# Patient Record
Sex: MALE | Race: WHITE | NOT HISPANIC OR LATINO | Employment: FULL TIME | URBAN - METROPOLITAN AREA
[De-identification: names, ages, dates, MRNs, and addresses within clinical notes are randomized per-mention and may not be internally consistent; named-entity substitution may affect disease eponyms.]

---

## 2023-03-02 PROBLEM — M21.40 PES PLANUS: Status: ACTIVE | Noted: 2023-03-02

## 2023-03-02 PROBLEM — G44.309 POST-CONCUSSION HEADACHE: Status: ACTIVE | Noted: 2023-03-02

## 2023-03-02 PROBLEM — M54.2 NECK PAIN ON LEFT SIDE: Status: ACTIVE | Noted: 2023-03-02

## 2023-03-02 PROBLEM — S63.502A LEFT WRIST SPRAIN, INITIAL ENCOUNTER: Status: ACTIVE | Noted: 2023-03-02
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
## 2023-03-02 PROBLEM — K52.9 CHRONIC DIARRHEA: Status: ACTIVE | Noted: 2023-03-02

## 2023-03-02 PROBLEM — R11.0 NAUSEA IN ADULT: Status: ACTIVE | Noted: 2023-03-02

## 2023-03-02 PROBLEM — F41.9 ANXIETY DISORDER: Status: ACTIVE | Noted: 2023-03-02

## 2023-03-02 PROBLEM — R11.2 CANNABINOID HYPEREMESIS SYNDROME: Status: ACTIVE | Noted: 2023-03-02

## 2023-03-02 PROBLEM — R73.9 HYPERGLYCEMIA: Status: ACTIVE | Noted: 2023-03-02

## 2023-03-02 PROBLEM — S06.0XAA CONCUSSION: Status: ACTIVE | Noted: 2023-03-02

## 2023-03-02 PROBLEM — M21.40 FLAT FOOT: Status: ACTIVE | Noted: 2023-03-02

## 2023-03-02 PROBLEM — M79.642 PAIN OF LEFT HAND: Status: ACTIVE | Noted: 2023-03-02

## 2023-03-02 PROBLEM — R42 DIZZINESS: Status: ACTIVE | Noted: 2023-03-02

## 2023-03-02 PROBLEM — G56.02 CARPAL TUNNEL SYNDROME OF LEFT WRIST: Status: ACTIVE | Noted: 2023-03-02

## 2023-03-02 PROBLEM — F12.90 CANNABINOID HYPEREMESIS SYNDROME: Status: ACTIVE | Noted: 2023-03-02

## 2023-03-02 PROBLEM — F17.210 SMOKES CIGARETTES: Status: ACTIVE | Noted: 2023-03-02

## 2023-03-02 PROBLEM — E87.6 HYPOKALEMIA: Status: ACTIVE | Noted: 2023-03-02

## 2023-03-02 PROBLEM — S06.0XAD: Status: ACTIVE | Noted: 2023-03-02

## 2023-03-02 RX ORDER — TIZANIDINE HYDROCHLORIDE 4 MG/1
1-2 CAPSULE, GELATIN COATED ORAL EVERY 8 HOURS PRN
COMMUNITY
End: 2024-05-20 | Stop reason: ALTCHOICE

## 2023-03-02 RX ORDER — RIBOFLAVIN (VITAMIN B2) 100 MG
100 TABLET ORAL DAILY
COMMUNITY
End: 2023-12-26 | Stop reason: ALTCHOICE

## 2023-03-02 RX ORDER — LORAZEPAM 0.5 MG/1
TABLET ORAL
COMMUNITY

## 2023-03-02 RX ORDER — PROPRANOLOL HYDROCHLORIDE 20 MG/1
20 TABLET ORAL 2 TIMES DAILY
COMMUNITY

## 2023-03-02 RX ORDER — QUETIAPINE FUMARATE 25 MG/1
300 TABLET, FILM COATED ORAL NIGHTLY
COMMUNITY

## 2023-03-02 RX ORDER — LIDOCAINE HYDROCHLORIDE 20 MG/ML
1 INJECTION, SOLUTION EPIDURAL; INFILTRATION; INTRACAUDAL; PERINEURAL
COMMUNITY
Start: 2022-01-27 | End: 2024-05-20 | Stop reason: ALTCHOICE

## 2023-03-15 ENCOUNTER — TELEMEDICINE (OUTPATIENT)
Dept: PRIMARY CARE | Facility: CLINIC | Age: 28
End: 2023-03-15
Payer: COMMERCIAL

## 2023-03-15 DIAGNOSIS — S06.0XAD CONCUSSION WITH LOSS OF CONSCIOUSNESS STATUS UNKNOWN, SUBSEQUENT ENCOUNTER: Primary | ICD-10-CM

## 2023-03-15 PROCEDURE — 99213 OFFICE O/P EST LOW 20 MIN: CPT | Performed by: NURSE PRACTITIONER
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
## 2023-04-17 ENCOUNTER — OFFICE VISIT (OUTPATIENT)
Dept: PRIMARY CARE | Facility: CLINIC | Age: 28
End: 2023-04-17
Payer: COMMERCIAL

## 2023-04-17 VITALS
WEIGHT: 145.4 LBS | HEART RATE: 74 BPM | HEIGHT: 68 IN | DIASTOLIC BLOOD PRESSURE: 83 MMHG | BODY MASS INDEX: 22.04 KG/M2 | TEMPERATURE: 98.6 F | SYSTOLIC BLOOD PRESSURE: 125 MMHG

## 2023-04-17 DIAGNOSIS — B02.9 HERPES ZOSTER WITHOUT COMPLICATION: Primary | ICD-10-CM

## 2023-04-17 PROBLEM — E87.6 HYPOKALEMIA: Status: RESOLVED | Noted: 2023-03-02 | Resolved: 2023-04-17

## 2023-04-17 PROBLEM — S06.0XAA CONCUSSION: Status: RESOLVED | Noted: 2023-03-02 | Resolved: 2023-04-17

## 2023-04-17 PROBLEM — J30.2 SEASONAL ALLERGIES: Status: ACTIVE | Noted: 2023-04-17

## 2023-04-17 PROCEDURE — 99213 OFFICE O/P EST LOW 20 MIN: CPT | Performed by: NURSE PRACTITIONER

## 2023-04-17 RX ORDER — ACYCLOVIR 800 MG/1
800 TABLET ORAL
COMMUNITY

## 2023-04-17 ASSESSMENT — PATIENT HEALTH QUESTIONNAIRE - PHQ9
1. LITTLE INTEREST OR PLEASURE IN DOING THINGS: NOT AT ALL
SUM OF ALL RESPONSES TO PHQ9 QUESTIONS 1 AND 2: 0
2. FEELING DOWN, DEPRESSED OR HOPELESS: NOT AT ALL

## 2023-04-17 ASSESSMENT — ENCOUNTER SYMPTOMS
CONSTITUTIONAL NEGATIVE: 1
SHORTNESS OF BREATH: 0
WHEEZING: 0

## 2023-04-17 NOTE — PATIENT INSTRUCTIONS
Get norco rx  Get acyclovir rx  Declines gabapentin but has some at home. If you want to start this, start 300mg 1 a day x 2-3d then 1 twice a day x 2-3d then 1 three times a day  Call if sx worsen or change especially fever, chills, redness increasing around the areas.    Off work note-off thru 4/30-rtn 5/1    Return for physical      I will communicate with you via [employer] regarding messages and results. If you need help with this, you can call the support line at [phone].    IT WAS A PLEASURE TO SEE YOU TODAY. THANK YOU FOR CHOOSING US FOR YOUR HEALTHCARE NEEDS.

## 2023-04-17 NOTE — LETTER
April 17, 2023     Patient: Wade Holden   YOB: 1995   Date of Visit: 4/17/2023       To Whom It May Concern:    Wade Holden was seen in my clinic on 4/17/2023 at 2:00 pm. He will need to be off work through April 30th due to his medical condition. He can return to work with no restrictions on May 1, 2023.    If you have any questions or concerns, please don't hesitate to call.         Sincerely,         Maty Flor, APRN-CNP        CC: No Recipients

## 2023-04-17 NOTE — PROGRESS NOTES
Subjective   Patient ID: Wade Holden is a 27 y.o. male who presents for rib (Right sided rib pain.  Also having rash, went to [facility] and diagnosed with shingles in the rib area.  Saw Therapist and Liver specialist, but unsure of who.  Brother was 18 when he had colon cancer.  Defers counseling on quitting smoking.  Defers Tdap today. ).  Last physical: due    What was his brother's age when he had colon ca? Age 18  Did pt see gi dr? no  Did pt see liver dr? yes  If yes name and hernando  Name of psychiatrchristina-miguelito quit her practice. Now seeing glenis romero  No therapist  Does pt want to discuss quitting smoking? no  Does he want a tdap? No  Just moved  Been weaning down the ativan      Last labs-9/2022 cmp, 12/2021 cbc, 2019 a 1c 5.1; 2015 tsh nl    HPI  Rt rib pain x4d and rash x2d. Seen at [facility] ER today and dx with shingles.  Given rx for  norco (1d only), acyclovir  Runs sandblasters for washing machines      No care team member to display     Review of Systems   Constitutional: Negative.    Respiratory:  Negative for shortness of breath and wheezing.    Cardiovascular:  Negative for chest pain.   Skin:  Positive for rash.       Objective   Visit Vitals  /83   Pulse 74   Temp 37 °C (98.6 °F)      BP Readings from Last 3 Encounters:   04/17/23 125/83   09/19/22 124/80   09/13/22 133/87     Wt Readings from Last 3 Encounters:   04/17/23 66 kg (145 lb 6.4 oz)   09/19/22 61.3 kg (135 lb 2 oz)   09/13/22 59 kg (130 lb)       Physical Exam  Constitutional:       General: He is not in acute distress.     Appearance: Normal appearance.   Skin:     Comments: Red raised rash on rt side around to the back. Tender to touch. No drainage. No redness increasing around the areas.   Neurological:      Mental Status: He is alert.       Assessment/Plan   Diagnoses and all orders for this visit:  Herpes zoster without complication  Other orders  -     Follow Up In Primary Care; Future

## 2023-07-16 ASSESSMENT — ENCOUNTER SYMPTOMS
WHEEZING: 0
SHORTNESS OF BREATH: 0
CONSTITUTIONAL NEGATIVE: 1
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
## 2023-07-17 ENCOUNTER — OFFICE VISIT (OUTPATIENT)
Dept: PRIMARY CARE | Facility: CLINIC | Age: 28
End: 2023-07-17
Payer: COMMERCIAL

## 2023-07-17 VITALS
SYSTOLIC BLOOD PRESSURE: 134 MMHG | TEMPERATURE: 99.3 F | OXYGEN SATURATION: 95 % | BODY MASS INDEX: 22.44 KG/M2 | HEART RATE: 82 BPM | DIASTOLIC BLOOD PRESSURE: 82 MMHG | WEIGHT: 147.6 LBS

## 2023-07-17 DIAGNOSIS — E83.42 HYPOMAGNESEMIA: Primary | ICD-10-CM

## 2023-07-17 DIAGNOSIS — K21.9 GASTROESOPHAGEAL REFLUX DISEASE, UNSPECIFIED WHETHER ESOPHAGITIS PRESENT: ICD-10-CM

## 2023-07-17 DIAGNOSIS — R11.0 CHRONIC NAUSEA: ICD-10-CM

## 2023-07-17 DIAGNOSIS — R11.10 CHRONIC VOMITING: ICD-10-CM

## 2023-07-17 DIAGNOSIS — E87.6 HYPOKALEMIA: ICD-10-CM

## 2023-07-17 LAB
NON-UH HIE BUN/CREAT RATIO: 15
NON-UH HIE BUN: 12 MG/DL (ref 9–23)
NON-UH HIE CALCIUM: 9.7 MG/DL (ref 8.7–10.4)
NON-UH HIE CALCULATED OSMOLALITY: 279 MOSM/KG (ref 275–295)
NON-UH HIE CHLORIDE: 107 MMOL/L (ref 98–107)
NON-UH HIE CO2, VENOUS: 28 MMOL/L (ref 20–31)
NON-UH HIE CREATININE: 0.8 MG/DL (ref 0.6–1.1)
NON-UH HIE GFR AA: >60
NON-UH HIE GLOMERULAR FILTRATION RATE: >60 ML/MIN/1.73M?
NON-UH HIE GLUCOSE: 96 MG/DL (ref 74–106)
NON-UH HIE K: 4.6 MMOL/L (ref 3.5–5.1)
NON-UH HIE MAGNESIUM: 2.3 MG/DL (ref 1.6–2.6)
NON-UH HIE NA: 140 MMOL/L (ref 135–145)

## 2023-07-17 PROCEDURE — 99214 OFFICE O/P EST MOD 30 MIN: CPT | Performed by: NURSE PRACTITIONER

## 2023-07-17 RX ORDER — FAMOTIDINE 20 MG/1
20 TABLET, FILM COATED ORAL 2 TIMES DAILY
Qty: 60 TABLET | Refills: 5 | Status: SHIPPED | OUTPATIENT
Start: 2023-07-17 | End: 2024-05-20 | Stop reason: ALTCHOICE

## 2023-07-17 RX ORDER — METOCLOPRAMIDE 10 MG/1
10 TABLET ORAL 4 TIMES DAILY
Qty: 120 TABLET | Refills: 5 | Status: SHIPPED | OUTPATIENT
Start: 2023-07-17 | End: 2024-05-20 | Stop reason: ALTCHOICE

## 2023-07-17 ASSESSMENT — PATIENT HEALTH QUESTIONNAIRE - PHQ9
3. TROUBLE FALLING OR STAYING ASLEEP OR SLEEPING TOO MUCH: MORE THAN HALF THE DAYS
7. TROUBLE CONCENTRATING ON THINGS, SUCH AS READING THE NEWSPAPER OR WATCHING TELEVISION: MORE THAN HALF THE DAYS
9. THOUGHTS THAT YOU WOULD BE BETTER OFF DEAD, OR OF HURTING YOURSELF: SEVERAL DAYS
2. FEELING DOWN, DEPRESSED OR HOPELESS: MORE THAN HALF THE DAYS
SUM OF ALL RESPONSES TO PHQ9 QUESTIONS 1 AND 2: 4
4. FEELING TIRED OR HAVING LITTLE ENERGY: MORE THAN HALF THE DAYS
6. FEELING BAD ABOUT YOURSELF - OR THAT YOU ARE A FAILURE OR HAVE LET YOURSELF OR YOUR FAMILY DOWN: SEVERAL DAYS
10. IF YOU CHECKED OFF ANY PROBLEMS, HOW DIFFICULT HAVE THESE PROBLEMS MADE IT FOR YOU TO DO YOUR WORK, TAKE CARE OF THINGS AT HOME, OR GET ALONG WITH OTHER PEOPLE: SOMEWHAT DIFFICULT
1. LITTLE INTEREST OR PLEASURE IN DOING THINGS: MORE THAN HALF THE DAYS
1. LITTLE INTEREST OR PLEASURE IN DOING THINGS: MORE THAN HALF THE DAYS
5. POOR APPETITE OR OVEREATING: NEARLY EVERY DAY
8. MOVING OR SPEAKING SO SLOWLY THAT OTHER PEOPLE COULD HAVE NOTICED. OR THE OPPOSITE, BEING SO FIGETY OR RESTLESS THAT YOU HAVE BEEN MOVING AROUND A LOT MORE THAN USUAL: SEVERAL DAYS
SUM OF ALL RESPONSES TO PHQ9 QUESTIONS 1 AND 2: 4
SUM OF ALL RESPONSES TO PHQ QUESTIONS 1-9: 16
2. FEELING DOWN, DEPRESSED OR HOPELESS: MORE THAN HALF THE DAYS

## 2023-07-17 ASSESSMENT — ANXIETY QUESTIONNAIRES
5. BEING SO RESTLESS THAT IT IS HARD TO SIT STILL: MORE THAN HALF THE DAYS
2. NOT BEING ABLE TO STOP OR CONTROL WORRYING: NEARLY EVERY DAY
GAD7 TOTAL SCORE: 18
IF YOU CHECKED OFF ANY PROBLEMS ON THIS QUESTIONNAIRE, HOW DIFFICULT HAVE THESE PROBLEMS MADE IT FOR YOU TO DO YOUR WORK, TAKE CARE OF THINGS AT HOME, OR GET ALONG WITH OTHER PEOPLE: SOMEWHAT DIFFICULT
3. WORRYING TOO MUCH ABOUT DIFFERENT THINGS: NEARLY EVERY DAY
4. TROUBLE RELAXING: MORE THAN HALF THE DAYS
7. FEELING AFRAID AS IF SOMETHING AWFUL MIGHT HAPPEN: NEARLY EVERY DAY
6. BECOMING EASILY ANNOYED OR IRRITABLE: MORE THAN HALF THE DAYS
1. FEELING NERVOUS, ANXIOUS, OR ON EDGE: NEARLY EVERY DAY

## 2023-07-17 ASSESSMENT — ENCOUNTER SYMPTOMS
NAUSEA: 1
VOMITING: 1

## 2023-07-17 NOTE — PATIENT INSTRUCTIONS
Refer to gi dr  Labs today  Refill famotidine  Refill reglan    Return for wellness appt      I will communicate with you via [employer] regarding messages and results. If you need help with this, you can call the support line at [phone].    IT WAS A PLEASURE TO SEE YOU TODAY. THANK YOU FOR CHOOSING US FOR YOUR HEALTHCARE NEEDS.

## 2023-09-14 ENCOUNTER — OFFICE VISIT (OUTPATIENT)
Dept: PRIMARY CARE | Facility: CLINIC | Age: 28
End: 2023-09-14
Payer: COMMERCIAL

## 2023-09-14 VITALS
WEIGHT: 150.4 LBS | TEMPERATURE: 98 F | HEIGHT: 68 IN | SYSTOLIC BLOOD PRESSURE: 149 MMHG | DIASTOLIC BLOOD PRESSURE: 89 MMHG | BODY MASS INDEX: 22.79 KG/M2 | HEART RATE: 84 BPM

## 2023-09-14 DIAGNOSIS — F12.90 CANNABINOID HYPEREMESIS SYNDROME: Primary | ICD-10-CM

## 2023-09-14 DIAGNOSIS — E87.6 HYPOKALEMIA: ICD-10-CM

## 2023-09-14 DIAGNOSIS — L30.9 DERMATITIS: ICD-10-CM

## 2023-09-14 DIAGNOSIS — R11.2 CANNABINOID HYPEREMESIS SYNDROME: Primary | ICD-10-CM

## 2023-09-14 PROCEDURE — 99214 OFFICE O/P EST MOD 30 MIN: CPT | Performed by: NURSE PRACTITIONER

## 2023-09-14 RX ORDER — PREDNISONE 20 MG/1
TABLET ORAL
Qty: 10 TABLET | Refills: 0 | Status: SHIPPED | OUTPATIENT
Start: 2023-09-14 | End: 2023-12-26 | Stop reason: ALTCHOICE

## 2023-09-14 ASSESSMENT — ENCOUNTER SYMPTOMS
SHORTNESS OF BREATH: 0
NAUSEA: 1
CONSTITUTIONAL NEGATIVE: 1
WHEEZING: 0
VOMITING: 1

## 2023-09-14 NOTE — PATIENT INSTRUCTIONS
Prednisone for rash  Recheck potassium lab in 2wks. No fasting or appt needed.  To ER if vomiting restarts  Discuss with psychiatrist how to decrease marijuana safely  Continue to see dr vollweiler  Push fluids    Return for wellness appt      I will communicate with you via [employer] regarding messages and results. If you need help with this, you can call the support line at [phone].    IT WAS A PLEASURE TO SEE YOU TODAY. THANK YOU FOR CHOOSING US FOR YOUR HEALTHCARE NEEDS.
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
## 2023-09-26 DIAGNOSIS — J01.00 ACUTE NON-RECURRENT MAXILLARY SINUSITIS: Primary | ICD-10-CM

## 2023-09-26 RX ORDER — AMOXICILLIN AND CLAVULANATE POTASSIUM 875; 125 MG/1; MG/1
1 TABLET, FILM COATED ORAL 2 TIMES DAILY
Qty: 20 TABLET | Refills: 0 | Status: SHIPPED | OUTPATIENT
Start: 2023-09-26 | End: 2023-10-06

## 2023-12-26 ENCOUNTER — OFFICE VISIT (OUTPATIENT)
Dept: PRIMARY CARE | Facility: CLINIC | Age: 28
End: 2023-12-26
Payer: COMMERCIAL

## 2023-12-26 VITALS
TEMPERATURE: 98.6 F | HEIGHT: 68 IN | BODY MASS INDEX: 23.22 KG/M2 | SYSTOLIC BLOOD PRESSURE: 126 MMHG | HEART RATE: 105 BPM | WEIGHT: 153.2 LBS | OXYGEN SATURATION: 98 % | DIASTOLIC BLOOD PRESSURE: 76 MMHG

## 2023-12-26 DIAGNOSIS — M54.2 NECK PAIN: ICD-10-CM

## 2023-12-26 DIAGNOSIS — Z00.00 ROUTINE GENERAL MEDICAL EXAMINATION AT A HEALTH CARE FACILITY: Primary | ICD-10-CM

## 2023-12-26 PROBLEM — E83.42 HYPOMAGNESEMIA: Status: RESOLVED | Noted: 2023-07-17 | Resolved: 2023-12-26
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
## 2023-12-26 PROBLEM — S63.502A LEFT WRIST SPRAIN, INITIAL ENCOUNTER: Status: RESOLVED | Noted: 2023-03-02 | Resolved: 2023-12-26

## 2023-12-26 PROBLEM — R42 DIZZINESS: Status: RESOLVED | Noted: 2023-03-02 | Resolved: 2023-12-26

## 2023-12-26 PROBLEM — M21.40 FLAT FOOT: Status: RESOLVED | Noted: 2023-03-02 | Resolved: 2023-12-26

## 2023-12-26 PROBLEM — M79.642 PAIN OF LEFT HAND: Status: RESOLVED | Noted: 2023-03-02 | Resolved: 2023-12-26

## 2023-12-26 PROBLEM — R11.0 NAUSEA IN ADULT: Status: RESOLVED | Noted: 2023-03-02 | Resolved: 2023-12-26

## 2023-12-26 PROCEDURE — 3008F BODY MASS INDEX DOCD: CPT | Performed by: NURSE PRACTITIONER

## 2023-12-26 PROCEDURE — 99395 PREV VISIT EST AGE 18-39: CPT | Performed by: NURSE PRACTITIONER

## 2023-12-26 RX ORDER — PREDNISONE 20 MG/1
TABLET ORAL
Qty: 10 TABLET | Refills: 0 | Status: SHIPPED | OUTPATIENT
Start: 2023-12-26 | End: 2024-01-08 | Stop reason: SDUPTHER

## 2023-12-26 ASSESSMENT — ENCOUNTER SYMPTOMS
EYE DISCHARGE: 0
ABDOMINAL PAIN: 0
RHINORRHEA: 0
BACK PAIN: 1
EYE REDNESS: 0
POLYPHAGIA: 0
HEMATURIA: 0
MYALGIAS: 0
SHORTNESS OF BREATH: 0
CHILLS: 0
BRUISES/BLEEDS EASILY: 0
NECK PAIN: 1
BLOOD IN STOOL: 0
CONSTIPATION: 0
FREQUENCY: 0
ADENOPATHY: 0
DIZZINESS: 0
NERVOUS/ANXIOUS: 0
ARTHRALGIAS: 0
SORE THROAT: 0
WHEEZING: 0
DIARRHEA: 0
PALPITATIONS: 0
VOMITING: 0
DYSPHORIC MOOD: 0
FEVER: 0
POLYDIPSIA: 0
NAUSEA: 0
DYSURIA: 0
HEADACHES: 0
COUGH: 0
FATIGUE: 0

## 2023-12-26 ASSESSMENT — PATIENT HEALTH QUESTIONNAIRE - PHQ9
2. FEELING DOWN, DEPRESSED OR HOPELESS: SEVERAL DAYS
10. IF YOU CHECKED OFF ANY PROBLEMS, HOW DIFFICULT HAVE THESE PROBLEMS MADE IT FOR YOU TO DO YOUR WORK, TAKE CARE OF THINGS AT HOME, OR GET ALONG WITH OTHER PEOPLE: NOT DIFFICULT AT ALL
1. LITTLE INTEREST OR PLEASURE IN DOING THINGS: SEVERAL DAYS
SUM OF ALL RESPONSES TO PHQ9 QUESTIONS 1 AND 2: 2

## 2023-12-26 NOTE — PATIENT INSTRUCTIONS
Prednisone short course with food  Tylenol  Muscle relaxant-use one or the other, not both at the same time  See ortho      Handouts given to pt:  physical handout    stop smoking        I recommend signing up for MyChart.    Labs:    You can use the lab in our building when fasting. The hrs are: Monday-Friday, 7 a.m. - 5 p.m., Saturday 8 a.m. - 12 noon.   No appt needed, BUT YOU DO NEED THE PAPER ORDER.    Fasting is no food, drink, gum or mints other than water for 12 hrs.   Results will be back in 2-3 business days for most labs. It is always recommended for any orders (labs, xrays, ultrasounds,MRI, ct scan, procedures etc) to check with your insurance provider for expected costs or expenses to you.         You will get your results via phone from my medical assistant if you do not have MyChart.  OR  You will get your results via [employer]    If a result is urgent, I will call to speak to you.    Vaccines:  ---- flu vaccine and tetanus can get at a pharmacy      General recommendations:  Exercise-cardio 4-5d/wk 30min each day  Diet-Breakfast-toast (my favorite Meghan Benavidez Delighful Multigrain or Randall's Killer Bread Good Seed thin-sliced)/bagel/English muffin-whole wheat flour as a 1st ingredient or cereal/oatmeal/granola bar-fiber 4g or more or protein like eggs or peanut butter; optional veggies  Lunch-protein, 1/2c carb or 2 slices bread, veg 1c  Dinner-protein, fist sized carb, veg 1c  Fruit 2 a day  Dairy 2 a day-milk, soy milk, almond milk, cheese, yogurt, cottage cheese  Snacks-Protein-hard boiled egg, nuts (walnuts/almonds/pecans/pistachios 1/4c), hummus, beef/deer jerky or meat sticks; vegetable, fruit, dairy-milk(1%, skim, almond, soy)/cheese (not a lot of cheddar)/yogurt (Greek is best-my favorite Dannon Fruit on the Bottom Greek)/cottage cheese 2%; triscuits/ popcorn/wheat thins have a lot of fiber; follow serving size on bag/box/container  increase water  Limit alcohol to 1 drink per day for women and 2  drinks per day for men (1 drink=12oz beer or 5oz wine or 1 1/2oz liquor)  Calcium: 500mg 1 twice a day if age 50 and younger and 600mg 1 twice a day if over age 50 (calcium citrate can be taken without food)  Vitamin D: 800-5000 IU/day  Limit salt to <2300mg a day if age 50 and under and <1500mg a day if over age 50/have high bp or diabetes or kidney disease  Recommend folate for childbearing age women 0.4mg per day (can be found in a multivitamin)  Recommend 18mg/dL of iron a day if age 50 and under and 8mg/dL a day if over age 50; take on an empty stomach at bedtime  Use sunscreen   Wear seatbelt  Recommend safe sex practices: using condoms everytime you have sex, discuss with a new partner about their past partners/history of STDs/drug use, avoid drinking alcohol or using drugs as this increases the chance that you will participate in high-risk sex, for oral sex help protect your mouth by having your partner use a condom (male or female), women should not douche after sex, be aware of your partner's body and your body-look for signs of a sore, blister, rash, or discharge, and have regular exams and periodic tests for STDs.  No distracted driving  No driving when under influence of substances  Wear a seatbelt  Eye dr every 1-2yrs  Dentist every 6-12 mon  Tetanus shot every 10yrs  Recommend flu vaccine in the fall  Appt in 6mon for follow up on sugar and 1 year for physical      I will communicate with you via [employer] regarding messages and results. If you need help with this, you can call the support line at [phone].    IT WAS A PLEASURE TO SEE YOU TODAY. THANK YOU FOR CHOOSING US FOR YOUR HEALTHCARE NEEDS.
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
## 2024-01-08 ENCOUNTER — ANCILLARY PROCEDURE (OUTPATIENT)
Dept: RADIOLOGY | Facility: CLINIC | Age: 29
End: 2024-01-08
Payer: COMMERCIAL

## 2024-01-08 ENCOUNTER — OFFICE VISIT (OUTPATIENT)
Dept: ORTHOPEDIC SURGERY | Facility: CLINIC | Age: 29
End: 2024-01-08
Payer: COMMERCIAL

## 2024-01-08 DIAGNOSIS — M54.12 CERVICAL RADICULOPATHY: Primary | ICD-10-CM

## 2024-01-08 DIAGNOSIS — M54.2 NECK PAIN: ICD-10-CM

## 2024-01-08 PROCEDURE — 3008F BODY MASS INDEX DOCD: CPT | Performed by: PHYSICIAN ASSISTANT

## 2024-01-08 PROCEDURE — 72050 X-RAY EXAM NECK SPINE 4/5VWS: CPT | Performed by: RADIOLOGY

## 2024-01-08 PROCEDURE — 99204 OFFICE O/P NEW MOD 45 MIN: CPT | Performed by: PHYSICIAN ASSISTANT

## 2024-01-08 PROCEDURE — 72050 X-RAY EXAM NECK SPINE 4/5VWS: CPT

## 2024-01-08 PROCEDURE — 99214 OFFICE O/P EST MOD 30 MIN: CPT | Performed by: PHYSICIAN ASSISTANT

## 2024-01-08 RX ORDER — PREDNISONE 10 MG/1
TABLET ORAL
Qty: 30 TABLET | Refills: 0 | Status: SHIPPED | OUTPATIENT
Start: 2024-01-08 | End: 2024-02-27 | Stop reason: ALTCHOICE

## 2024-01-08 ASSESSMENT — PAIN SCALES - GENERAL: PAINLEVEL_OUTOF10: 5 - MODERATE PAIN

## 2024-01-08 ASSESSMENT — PAIN - FUNCTIONAL ASSESSMENT: PAIN_FUNCTIONAL_ASSESSMENT: 0-10
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
## 2024-02-13 ENCOUNTER — OFFICE VISIT (OUTPATIENT)
Dept: PRIMARY CARE | Facility: CLINIC | Age: 29
End: 2024-02-13
Payer: COMMERCIAL

## 2024-02-13 VITALS
WEIGHT: 156 LBS | SYSTOLIC BLOOD PRESSURE: 133 MMHG | HEART RATE: 71 BPM | TEMPERATURE: 97.7 F | HEIGHT: 69 IN | BODY MASS INDEX: 23.11 KG/M2 | DIASTOLIC BLOOD PRESSURE: 82 MMHG | OXYGEN SATURATION: 96 %

## 2024-02-13 DIAGNOSIS — M79.671 RIGHT FOOT PAIN: Primary | ICD-10-CM

## 2024-02-13 PROCEDURE — 99213 OFFICE O/P EST LOW 20 MIN: CPT | Performed by: FAMILY MEDICINE

## 2024-02-13 PROCEDURE — 3008F BODY MASS INDEX DOCD: CPT | Performed by: FAMILY MEDICINE

## 2024-02-13 RX ORDER — GABAPENTIN 300 MG/1
300 CAPSULE ORAL 3 TIMES DAILY
Qty: 90 CAPSULE | Refills: 5 | Status: SHIPPED | OUTPATIENT
Start: 2024-02-13 | End: 2024-05-20 | Stop reason: ALTCHOICE

## 2024-02-13 ASSESSMENT — PATIENT HEALTH QUESTIONNAIRE - PHQ9
2. FEELING DOWN, DEPRESSED OR HOPELESS: NOT AT ALL
1. LITTLE INTEREST OR PLEASURE IN DOING THINGS: NOT AT ALL
SUM OF ALL RESPONSES TO PHQ9 QUESTIONS 1 AND 2: 0

## 2024-02-13 NOTE — PROGRESS NOTES
Subjective   Patient ID: Wade Holden is a 28 y.o. male who presents for Follow-up (Foot pain from fracture in aug, 2023).  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   Eyes: no blurred vision and no eyesight problems.   ENT: no hearing loss, no nasal congestion, no nasal discharge, no hoarseness and no sore throat.   Cardiovascular: no chest pain, no intermittent leg claudication, no lower extremity edema, no palpitations and no syncope.   Respiratory: no cough, no shortness of breath during exertion, no shortness of breath at rest and no wheezing.   Gastrointestinal: no abdominal pain, no blood in stools, no constipation, no diarrhea, no melena, no nausea, no rectal pain and no vomiting.   Genitourinary: no dysuria, no change in urinary frequency, no urinary hesitancy, no feelings of urinary urgency and no vaginal discharge.   Musculoskeletal: no arthralgias,  no back pain and no myalgias.   Integumentary: no new skin lesions and no rashes.   Neurological: no difficulty walking, no headache, no limb weakness, no numbness and no tingling.   Psychiatric: no anxiety, no depression, no anhedonia and no substance use disorders.   Endocrine: no recent weight gain  "and no recent weight loss.   Hematologic/Lymphatic: no tendency for easy bruising and no swollen glands .    Objective    /82   Pulse 71   Temp 36.5 °C (97.7 °F)   Ht 1.753 m (5' 9\")   Wt 70.8 kg (156 lb)   SpO2 96%   BMI 23.04 kg/m²    Physical Exam  The patient appeared well nourished and normally developed. Vital signs as documented. Head exam is unremarkable. No scleral icterus or corneal arcus noted.  Pupils are equal round reactive to light extraocular movements are intact no hemorrhages noted on funduscopic exam mouth mucous membranes are moist no exudates ears canals clear TMs are gray pearly not injected nose no rhinorrhea or epistaxis Neck is without jugular venous distension, thyromegaly, or carotid bruits. Carotid upstrokes are brisk bilaterally. Lungs are clear to auscultation and percussion. Cardiac exam reveals the PMI to be normally sized and situated. Rhythm is regular. First and second heart sounds normal. No murmurs, rubs or gallops. Abdominal exam reveals normal bowel sounds, no masses, no organomegaly and no aortic enlargement. Extremities are nonedematous and both femoral and pedal pulses are normal.  Neurologic exam DTRs are equal bilaterally no focal deficits strength is symmetrical heme lymph no palpable lymph nodes in the neck axilla or groin    Assessment/Plan   Problem List Items Addressed This Visit       Right foot pain - Primary    Relevant Medications    gabapentin (Neurontin) 300 mg capsule    Other Relevant Orders    XR foot right 1-2 views   Differential diagnosis overuse contusion arthritis neuropathy  Treat conservatively follow-up if no improvement    Jo Ann Fulton MD  "

## 2024-02-15 ENCOUNTER — TELEPHONE (OUTPATIENT)
Dept: PRIMARY CARE | Facility: CLINIC | Age: 29
End: 2024-02-15
Payer: COMMERCIAL

## 2024-02-19 ENCOUNTER — TELEPHONE (OUTPATIENT)
Dept: PRIMARY CARE | Facility: CLINIC | Age: 29
End: 2024-02-19
Payer: COMMERCIAL

## 2024-02-19 DIAGNOSIS — M79.671 RIGHT FOOT PAIN: Primary | ICD-10-CM

## 2024-02-19 NOTE — RESULT ENCOUNTER NOTE
Please call Wade and tell him that the x-ray of his foot shows no fracture  This is most likely overuse from being on his feet all day I like him to wear shoes with good arch support and consider support hose

## 2024-02-19 NOTE — TELEPHONE ENCOUNTER
Pt wears custom insole, pt advised to FU with Pod. As they were prescribed years ago and maybe the cause of his foot pain. Pt also states he can't take gabapentin it causes thoughts of self harm and was rec. By his therapist to avoid. Pt would like to know if there is another medication you recommend at this time?     stable with handling/good tolerance to handling - intervention needed good tolerance to handling - intervention needed

## 2024-02-19 NOTE — TELEPHONE ENCOUNTER
----- Message from Jo Ann Fulton MD sent at 2/18/2024 10:16 PM EST -----  Please call Wade and tell him that the x-ray of his foot shows no fracture  This is most likely overuse from being on his feet all day I like him to wear shoes with good arch support and consider support hose

## 2024-02-27 ENCOUNTER — OFFICE VISIT (OUTPATIENT)
Dept: PRIMARY CARE | Facility: CLINIC | Age: 29
End: 2024-02-27
Payer: COMMERCIAL

## 2024-02-27 VITALS
WEIGHT: 160 LBS | DIASTOLIC BLOOD PRESSURE: 89 MMHG | HEIGHT: 69 IN | OXYGEN SATURATION: 97 % | SYSTOLIC BLOOD PRESSURE: 138 MMHG | HEART RATE: 112 BPM | BODY MASS INDEX: 23.7 KG/M2 | TEMPERATURE: 98.6 F

## 2024-02-27 DIAGNOSIS — J40 BRONCHITIS: Primary | ICD-10-CM

## 2024-02-27 PROCEDURE — 99213 OFFICE O/P EST LOW 20 MIN: CPT | Performed by: NURSE PRACTITIONER

## 2024-02-27 PROCEDURE — 3008F BODY MASS INDEX DOCD: CPT | Performed by: NURSE PRACTITIONER

## 2024-02-27 RX ORDER — DOXYCYCLINE 100 MG/1
100 CAPSULE ORAL 2 TIMES DAILY
Qty: 20 CAPSULE | Refills: 0 | Status: SHIPPED | OUTPATIENT
Start: 2024-02-27 | End: 2024-03-08

## 2024-02-27 ASSESSMENT — ENCOUNTER SYMPTOMS
FACIAL SWELLING: 0
DIARRHEA: 0
ABDOMINAL PAIN: 0
FEVER: 0
COUGH: 1
EYE REDNESS: 0
PALPITATIONS: 0
EYE DISCHARGE: 0
CHEST TIGHTNESS: 0
VOMITING: 0
MYALGIAS: 0
FATIGUE: 0
WHEEZING: 1
SINUS PRESSURE: 1
CHILLS: 0
SHORTNESS OF BREATH: 0
RHINORRHEA: 0
SORE THROAT: 0
HEADACHES: 0
ARTHRALGIAS: 0

## 2024-02-27 ASSESSMENT — PATIENT HEALTH QUESTIONNAIRE - PHQ9
1. LITTLE INTEREST OR PLEASURE IN DOING THINGS: NOT AT ALL
2. FEELING DOWN, DEPRESSED OR HOPELESS: MORE THAN HALF THE DAYS
SUM OF ALL RESPONSES TO PHQ9 QUESTIONS 1 AND 2: 2

## 2024-02-27 NOTE — PATIENT INSTRUCTIONS
Do fasting labs    Stop smoking    Doxycycline  Push fluids  To ER tomorrow if not urinate at least 3 times or chest pain worsens  Rest  Dayquil  Nyquil  Use the inhaler  Call if symptoms worsen or change especially sob, wheezing, tight chest or not starting to get better in 2-3d    Keep July appt    I will communicate with you via [employer] [employer] regarding messages and results. If you need help with this, you can call the support line at [phone].    IT WAS A PLEASURE TO SEE YOU TODAY. THANK YOU FOR CHOOSING US FOR YOUR HEALTHCARE NEEDS.

## 2024-02-27 NOTE — PROGRESS NOTES
Subjective   Patient ID: Wade Holden is a 28 y.o. male who presents for Cough.  Last physical:  12/26/23  Did pt do fasting labs from dec?  No   Is pt fasting? No   Does pt want to discuss quitting smoking cigarettes? No   current sx- ear pain, post nasal drip, mucus yellow,  cough, congestion, sinus pressure, no taste or smell   when did sx start- these symptoms came back last night.   did pt take a covid-19 test? Pos on feb 14th.   if yes when?      Any other questions or concerns that pt wants to discuss today? No       HPI  pt has chest congestion, sinus pressure, very mucusy, sick for 2 weeks. Got fully better x 2d  Tested positive for Covid 2 weeks ago.   Pt went to ER 2/24/24 and 2/25/24 am but left [facility] and 2/25/24 p; was there for vomiting; none since then    Current sx-Lf ear pain/popping, post nasal drip, cough-with yellow mucus, wheezing, stuffy nose, dry throat, sinus pressure, no taste or smell since last night  Pain to take a deep breath      no  sob, fever, chills, muscle/jt pain,  ST, diarrhea, nausea, abdominal pain, fatigue, weakness, red eye, rash, bruising, cyanosis,  ear pressure, runny nose,  pain with deep breath, leg or foot swelling, calf pain  loss of appetite-yes  fluids-yes-gatorade, water, tea-today :1 bottle gatorade, 8oz water  has not urinated at least 3 times in 24hrs  Seasonal allergies-none  Selftxt-sudafed, mucinex, dayquil, nyquil    known exposure to COVID-19-22 cases at work  No known exposure to strep  No known exposure to influenza  No one sick around the pt      Risk factors:  Chronic disease/comorbidities: smoker  Age: not 65yrs of age and older      No care team member to display     Review of Systems   Constitutional:  Negative for chills, fatigue and fever.   HENT:  Positive for ear pain, postnasal drip and sinus pressure. Negative for congestion, ear discharge, facial swelling, rhinorrhea and sore throat.    Eyes:  Negative for discharge and redness.    Respiratory:  Positive for cough and wheezing. Negative for chest tightness and shortness of breath.    Cardiovascular:  Negative for chest pain, palpitations and leg swelling.   Gastrointestinal:  Negative for abdominal pain, diarrhea and vomiting.   Musculoskeletal:  Negative for arthralgias and myalgias.   Skin:  Negative for rash.   Neurological:  Negative for headaches.       Objective   Visit Vitals  /89   Pulse (!) 112   Temp 37 °C (98.6 °F)      BP Readings from Last 3 Encounters:   02/27/24 138/89   02/13/24 133/82   12/26/23 126/76     Wt Readings from Last 3 Encounters:   02/27/24 72.6 kg (160 lb)   02/13/24 70.8 kg (156 lb)   12/26/23 69.5 kg (153 lb 3.2 oz)       Physical Exam  Constitutional:       Appearance: Normal appearance.   HENT:      Head: Normocephalic.      Right Ear: Tympanic membrane, ear canal and external ear normal.      Left Ear: Tympanic membrane, ear canal and external ear normal.      Nose: Nose normal.      Mouth/Throat:      Mouth: Mucous membranes are moist.      Pharynx: No oropharyngeal exudate or posterior oropharyngeal erythema.   Cardiovascular:      Rate and Rhythm: Normal rate and regular rhythm.      Heart sounds: Normal heart sounds.   Pulmonary:      Effort: Pulmonary effort is normal.      Breath sounds: Normal breath sounds. No wheezing, rhonchi or rales.   Lymphadenopathy:      Cervical: No cervical adenopathy.   Neurological:      Mental Status: He is alert.         Assessment/Plan   Diagnoses and all orders for this visit:  Bronchitis  -     doxycycline (Vibramycin) 100 mg capsule; Take 1 capsule (100 mg) by mouth 2 times a day for 10 days. Take with at least 8 ounces (large glass) of water, do not lie down for 30 minutes after

## 2024-02-29 ENCOUNTER — TELEPHONE (OUTPATIENT)
Dept: PRIMARY CARE | Facility: CLINIC | Age: 29
End: 2024-02-29
Payer: COMMERCIAL

## 2024-02-29 NOTE — TELEPHONE ENCOUNTER
----- Message from POLLO Spencer sent at 2/27/2024 11:41 AM EST -----  Regarding: how is he feeling? is he urinating at least 3 times in 24hrs? any pain with deep breath?  Encourage pt to sign up for mychart

## 2024-02-29 NOTE — TELEPHONE ENCOUNTER
Says he is starting to feel better, is urinating atleast 3 times in 24 hours and no pain with breathing.

## 2024-03-01 PROBLEM — M79.671 RIGHT FOOT PAIN: Status: ACTIVE | Noted: 2024-03-01

## 2024-03-13 NOTE — TELEPHONE ENCOUNTER
Foot update. Pt states he is still having trouble/pain. Told him to try the OTC Meds  as listed, and if it doesn't get better to call to FU

## 2024-03-14 NOTE — TELEPHONE ENCOUNTER
Pls let pt know he should set up appt with the podiatrist.  Does he have one or do I need to put in a referral?

## 2024-05-20 ENCOUNTER — OFFICE VISIT (OUTPATIENT)
Dept: PRIMARY CARE | Facility: CLINIC | Age: 29
End: 2024-05-20
Payer: COMMERCIAL

## 2024-05-20 ENCOUNTER — HOSPITAL ENCOUNTER (OUTPATIENT)
Dept: RADIOLOGY | Facility: EXTERNAL LOCATION | Age: 29
Discharge: HOME | End: 2024-05-20

## 2024-05-20 VITALS
OXYGEN SATURATION: 97 % | HEIGHT: 69 IN | DIASTOLIC BLOOD PRESSURE: 81 MMHG | SYSTOLIC BLOOD PRESSURE: 121 MMHG | BODY MASS INDEX: 23.46 KG/M2 | WEIGHT: 158.4 LBS | HEART RATE: 86 BPM | TEMPERATURE: 97.8 F

## 2024-05-20 DIAGNOSIS — M79.671 RIGHT FOOT PAIN: Primary | ICD-10-CM

## 2024-05-20 DIAGNOSIS — M79.671 RIGHT FOOT PAIN: ICD-10-CM

## 2024-05-20 PROCEDURE — 99213 OFFICE O/P EST LOW 20 MIN: CPT | Performed by: NURSE PRACTITIONER

## 2024-05-20 PROCEDURE — 3008F BODY MASS INDEX DOCD: CPT | Performed by: NURSE PRACTITIONER

## 2024-05-20 RX ORDER — OXYCODONE AND ACETAMINOPHEN 5; 325 MG/1; MG/1
1 TABLET ORAL EVERY 6 HOURS PRN
Qty: 12 TABLET | Refills: 0 | Status: SHIPPED | OUTPATIENT
Start: 2024-05-20 | End: 2024-05-23

## 2024-05-20 RX ORDER — DOXYCYCLINE 100 MG/1
100 CAPSULE ORAL 2 TIMES DAILY
Qty: 20 CAPSULE | Refills: 0 | Status: CANCELLED | OUTPATIENT
Start: 2024-05-20 | End: 2024-05-30

## 2024-05-20 RX ORDER — PREDNISONE 20 MG/1
TABLET ORAL
Qty: 20 TABLET | Refills: 0 | Status: SHIPPED | OUTPATIENT
Start: 2024-05-20

## 2024-05-20 ASSESSMENT — ENCOUNTER SYMPTOMS
WHEEZING: 0
SHORTNESS OF BREATH: 0
CONSTITUTIONAL NEGATIVE: 1

## 2024-05-20 ASSESSMENT — PATIENT HEALTH QUESTIONNAIRE - PHQ9
1. LITTLE INTEREST OR PLEASURE IN DOING THINGS: NOT AT ALL
2. FEELING DOWN, DEPRESSED OR HOPELESS: NOT AT ALL
SUM OF ALL RESPONSES TO PHQ9 QUESTIONS 1 AND 2: 0

## 2024-05-20 NOTE — PROGRESS NOTES
Subjective   Patient ID: Wade Holden is a 28 y.o. male who presents for Foot Swelling.  Last physical:  12/26/23; has appt scheduled in july    Is pt fasting? No   Did he do fasting labs from dec? He thinks he did    Does pt want to discuss quitting smoking today? No   When did swelling and pain start? Friday   Where is the swelling and pain located? Heel   Any fall or injury? No   Any other questions or concerns that pt wants to discuss today? No     Pt did take perocet to help with pain.       HPI  Swelling and pain in right foot top of foot near ankle and around outer side- x 3d  When step down on it and if flex or extend, it is painful  In the middle of moving  Tried new shoes-did not help  no fall or injury  ache, dull, burn, shooting, stabbing, spasms, popping, cracking  no new work activities or exercise routine  Pain right now 0/10  pain at worst 5-6/10 when stepping on it  Popping noted with walking  no redness, rash, warmth, bruising   no numbness, tingling, or weakness foot leg  no pain up the leg  Feels like foot going to give out  Pain better when walk on tiptoes  able to bear wt  no wt loss, fever, or chills  no new bowel or bladder problems-no urinary incontinence or urinary retention or fecal incontinence; no uti sx; no abd pain; no progressive weakness or impaired coordination  no recent spinal procedure or IV drug use    selftxt:  percocet,tylenol    Pt has medical marijuana card; pain mgmt won't see him until it expires    No care team member to display     Review of Systems   Constitutional: Negative.    Respiratory:  Negative for shortness of breath and wheezing.    Cardiovascular:  Negative for chest pain.   Musculoskeletal:         Rt foot pain       Objective   Visit Vitals  /81   Pulse 86   Temp 36.6 °C (97.8 °F)      BP Readings from Last 3 Encounters:   05/20/24 121/81   02/27/24 138/89   02/13/24 133/82     Wt Readings from Last 3 Encounters:   05/20/24 71.8 kg (158 lb 6.4  oz)   02/27/24 72.6 kg (160 lb)   02/13/24 70.8 kg (156 lb)       Physical Exam  Constitutional:       General: He is not in acute distress.     Appearance: Normal appearance.   Musculoskeletal:      Comments: Rt foot with slight swelling top of foot near ankle and around to lateral malleolus  No redness rash or bruising.  Pain to flex and extend in the swelling area.  Pain in swelling area when bear wt.  Pedal pulses 2+.   Neurological:      Mental Status: He is alert.       Assessment/Plan   Diagnoses and all orders for this visit:  Right foot pain  -     predniSONE (Deltasone) 20 mg tablet; 3 tabs daily x3d then 2 tabs daily x 3d then 1 tab daily x 3d then 1/2 tab daily x 3d with food  -     XR foot right 3+ views; Future  -     MR foot right wo IV contrast; Future  -     Referral to Orthopaedic Surgery; Future  -     oxyCODONE-acetaminophen (Percocet) 5-325 mg tablet; Take 1 tablet by mouth every 6 hours if needed for severe pain (7 - 10) for up to 3 days.

## 2024-05-20 NOTE — PATIENT INSTRUCTIONS
Xray foot today  Prednisone  No advil, motrin, ibuprofen, aleve, naproxen or other anti-inflammatories while on prednisone.  Tylenol (acetaminophen) is OK to take.   Short rx for percocet  Mri foot  See ortho dr Long July follow up appt      I will communicate with you via [facility] regarding messages and results. If you need help with this, you can call the support line at [phone].    IT WAS A PLEASURE TO SEE YOU TODAY. THANK YOU FOR CHOOSING US FOR YOUR HEALTHCARE NEEDS.

## 2024-05-29 ENCOUNTER — OFFICE VISIT (OUTPATIENT)
Dept: ORTHOPEDIC SURGERY | Facility: CLINIC | Age: 29
End: 2024-05-29
Payer: COMMERCIAL

## 2024-05-29 VITALS — BODY MASS INDEX: 23.4 KG/M2 | HEIGHT: 69 IN | WEIGHT: 158 LBS

## 2024-05-29 DIAGNOSIS — M79.671 RIGHT FOOT PAIN: ICD-10-CM

## 2024-05-29 PROCEDURE — 3008F BODY MASS INDEX DOCD: CPT | Performed by: ORTHOPAEDIC SURGERY

## 2024-05-29 PROCEDURE — 99214 OFFICE O/P EST MOD 30 MIN: CPT | Performed by: ORTHOPAEDIC SURGERY

## 2024-05-29 NOTE — PROGRESS NOTES
Subjective    Patient ID: Wade Holden is a 28 y.o. male.    Chief Complaint: Pain of the Right Foot (Nki/Pain x several months)       This is a 28-year-old male referred by his PCP for evaluation of right foot pain.  Ongoing for the past 8 months.  Describes majority the pain along the dorsum of the right midfoot.  Pain is made worse when he is on his feet for prolonged periods of time.  He has tried rest, modifications of activities, various attempts at treatment of with interval Medrol Dosepaks, change in shoewear, gabapentin as well as the use of Percocet.  He is scheduled for an MRI within the next 2 weeks.  He denies any specific injury.    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  Patient does have a history of hypertension as well as ulcers    Physical Examination  Constitutional: Patient's height and weight reviewed, appears well kempt  Psychiatric: Alert and oriented ×3, appropriate mood and behavior  Pulmonary: Breathing appears nonlabored, no apparent distress  Lymphatic: No appreciable lymphadenopathy to both the upper and lower extremities  Skin: No open lesions, rashes, ulcerations  Neurologic: Gross motor and sensory exam appear intact (except for abnormalities noted in the below muscle skeletal exam)    Musculoskeletal: The right foot reveals no gross deformity.  The right ankle mortise is well aligned and stable to ligamentous examination.  There is no erythema or warmth about the foot or ankle.  There is some mild tenderness overlying the right midfoot.  Patient does have difficulty with toe raising on the right side but does not have tenderness along the course of the posterior tibialis tendons.  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  I feel is reasonable her him to go ahead with an MRI scan of his right foot and have suggested referral to podiatry following completion of the MRI scan for further evaluation and consideration for custom made orthotics due to his flatfoot deformity.          Current Outpatient Medications:     acyclovir (Zovirax) 800 mg tablet, Take 1 tablet (800 mg) by mouth 5 times a day., Disp: , Rfl:     LORazepam (Ativan) 0.5 mg tablet, , Disp: , Rfl:     NON FORMULARY, Medical Marijuana (not [facility] prescribed), Disp: , Rfl:     predniSONE (Deltasone) 20 mg tablet, 3 tabs daily x3d then 2 tabs daily x 3d then 1 tab daily x 3d then 1/2 tab daily x 3d with food, Disp: 20 tablet, Rfl: 0    promethazine HCl (PHENERGAN ORAL), , Disp: , Rfl:     propranolol (Inderal) 20 mg tablet, Take 1 tablet (20 mg) by mouth 2 times a day., Disp: , Rfl:     QUEtiapine (SEROquel) 25 mg tablet, Take 12 tablets (300 mg) by mouth once daily at bedtime., Disp: , Rfl:

## 2024-06-05 DIAGNOSIS — M79.671 RIGHT FOOT PAIN: Primary | ICD-10-CM

## 2024-07-01 ENCOUNTER — APPOINTMENT (OUTPATIENT)
Dept: PRIMARY CARE | Facility: CLINIC | Age: 29
End: 2024-07-01
Payer: COMMERCIAL

## 2024-07-03 ENCOUNTER — APPOINTMENT (OUTPATIENT)
Dept: PRIMARY CARE | Facility: CLINIC | Age: 29
End: 2024-07-03
Payer: COMMERCIAL

## 2024-07-03 PROBLEM — S06.0XAD: Status: RESOLVED | Noted: 2023-03-02 | Resolved: 2024-07-03

## 2024-07-03 PROBLEM — G44.309 POST-CONCUSSION HEADACHE: Status: RESOLVED | Noted: 2023-03-02 | Resolved: 2024-07-03

## 2024-08-19 ENCOUNTER — OFFICE VISIT (OUTPATIENT)
Dept: PRIMARY CARE | Facility: CLINIC | Age: 29
End: 2024-08-19
Payer: COMMERCIAL

## 2024-08-19 VITALS
SYSTOLIC BLOOD PRESSURE: 131 MMHG | WEIGHT: 163.2 LBS | HEART RATE: 100 BPM | BODY MASS INDEX: 24.17 KG/M2 | TEMPERATURE: 97.5 F | HEIGHT: 69 IN | DIASTOLIC BLOOD PRESSURE: 87 MMHG | OXYGEN SATURATION: 96 %

## 2024-08-19 DIAGNOSIS — R19.7 DIARRHEA, UNSPECIFIED TYPE: Primary | ICD-10-CM

## 2024-08-19 DIAGNOSIS — J40 BRONCHITIS: ICD-10-CM

## 2024-08-19 PROCEDURE — 3008F BODY MASS INDEX DOCD: CPT | Performed by: NURSE PRACTITIONER

## 2024-08-19 PROCEDURE — 99213 OFFICE O/P EST LOW 20 MIN: CPT | Performed by: NURSE PRACTITIONER

## 2024-08-19 RX ORDER — ALBUTEROL SULFATE 90 UG/1
2 INHALANT RESPIRATORY (INHALATION) EVERY 6 HOURS PRN
Qty: 18 G | Refills: 1 | Status: SHIPPED | OUTPATIENT
Start: 2024-08-19 | End: 2025-08-19

## 2024-08-19 RX ORDER — BENZONATATE 200 MG/1
200 CAPSULE ORAL NIGHTLY
Qty: 10 CAPSULE | Refills: 0 | Status: SHIPPED | OUTPATIENT
Start: 2024-08-19 | End: 2024-08-29

## 2024-08-19 RX ORDER — DOXYCYCLINE 100 MG/1
100 CAPSULE ORAL 2 TIMES DAILY
Qty: 20 CAPSULE | Refills: 0 | Status: SHIPPED | OUTPATIENT
Start: 2024-08-19 | End: 2024-08-29

## 2024-08-19 RX ORDER — PREDNISONE 20 MG/1
TABLET ORAL
Qty: 20 TABLET | Refills: 0 | Status: CANCELLED | OUTPATIENT
Start: 2024-08-19

## 2024-08-19 ASSESSMENT — ENCOUNTER SYMPTOMS
EYE DISCHARGE: 0
HEADACHES: 0
CHILLS: 0
PALPITATIONS: 0
SORE THROAT: 1
VOMITING: 1
CHEST TIGHTNESS: 1
FATIGUE: 0
COUGH: 1
MYALGIAS: 0
SHORTNESS OF BREATH: 1
DIARRHEA: 1
ARTHRALGIAS: 0
WHEEZING: 1
RHINORRHEA: 0
EYE REDNESS: 0
SINUS PRESSURE: 1
ABDOMINAL PAIN: 0
FEVER: 0
FACIAL SWELLING: 0

## 2024-08-19 NOTE — PATIENT INSTRUCTIONS
Doxycycline-antibiotic  No prednisone due to stomach pain  Albuterol inhaler-pharmacist will show you how to use it  Tessalon for nighttime  Zyrtec for day  Mucinex plain for day-no letters like dm, cf, d etc  Fluids  Rest    Stool sample for diarrhea  Next gi  appt  To ER if stomach pain worsens or blood in stool      I will communicate with you via [employer] regarding messages and results. If you need help with this, you can call the support line at [phone].    IT WAS A PLEASURE TO SEE YOU TODAY. THANK YOU FOR CHOOSING US FOR YOUR HEALTHCARE NEEDS.

## 2024-08-19 NOTE — LETTER
August 19, 2024     Patient: Wade Holden   YOB: 1995   Date of Visit: 8/19/2024       To Whom It May Concern:    Wade Holdne was seen in my clinic on 8/19/2024 at 12:30 pm. Please excuse Wade for his absence from work on this day to make the appointment.    If you have any questions or concerns, please don't hesitate to call.         Sincerely,         Maty Flor, APRN-CNP        CC: No Recipients   Patient returning call. Made aware:    - Message from ADILENE Jurado sent at 5/13/2024  9:22 AM EDT -----  Kidney function is unchanged from 1 month ago. Electrolytes are normal except mildly elevated phosphorus. Parathyroid hormone remains mildly elevated but improved from 1 month ago. No changes to be made at present    Patient verbalized understanding.

## 2024-08-19 NOTE — PROGRESS NOTES
Subjective   Patient ID: Wade Holden is a 29 y.o. male who presents for Cough.  Last physical:  12/26/23; needs appt    current sx- chest congestion, decrease food intake, cough, sob, yellow mucus.   when did sx start- Sunday morning  did pt take a covid-19 test? Yes- 2 different times,   if yes when? Today   Does pt want to discuss quitting smoking? No       HPI  Stuffy nose, sob, wheezing, sweats, sinus pressure, chest congestion, cough with yellow mucus, ST, diarrhea, vomiting x 16hrs  Diarrhea x 2wks; no bld in stool; last liquid stool last time; last regular stool last night or this am  No recent abx or travel  No txt for diarrhea  Has been exposed to someone with diarrhea  Covid test today neg  Last vomit yesterday    Scabies, covid and rsv going around work    no  fever, chills, muscle/jt pain, ST, new loss of taste or smell, diarrhea, vomiting, nausea, abdominal pain, fatigue, weakness, red eye, rash, bruising, cyanosis, ear pain, ear pressure, runny nose, post nasal drip, pain with deep breath, leg or foot swelling, calf pain  loss of appetite-yes  fluids-yes  has urinated at least 3 times in 24hrs  Selftxt-nyquil-stomach pain; stopped prednisone due to stomach pain, tylenol 6 a day      No known exposure to strep  No known exposure to influenza        Risk factors:  Chronic disease/comorbidities: smoker  not a healthcare worker  Age: not 65yrs of age and older        No care team member to display     Review of Systems   Constitutional:  Negative for chills, fatigue and fever.   HENT:  Positive for congestion, sinus pressure and sore throat. Negative for ear discharge, ear pain, facial swelling, postnasal drip and rhinorrhea.    Eyes:  Negative for discharge and redness.   Respiratory:  Positive for cough, chest tightness, shortness of breath and wheezing.    Cardiovascular:  Negative for chest pain, palpitations and leg swelling.   Gastrointestinal:  Positive for diarrhea and vomiting. Negative  for abdominal pain.   Musculoskeletal:  Negative for arthralgias and myalgias.   Skin:  Negative for rash.   Neurological:  Negative for headaches.       Objective   Visit Vitals  BP (!) 149/96   Pulse 100   Temp 36.4 °C (97.5 °F)      BP Readings from Last 3 Encounters:   08/19/24 (!) 149/96   05/20/24 121/81   02/27/24 138/89     Wt Readings from Last 3 Encounters:   08/19/24 74 kg (163 lb 3.2 oz)   05/29/24 71.7 kg (158 lb)   05/20/24 71.8 kg (158 lb 6.4 oz)       Physical Exam  Constitutional:       Appearance: Normal appearance.   HENT:      Head: Normocephalic.      Right Ear: Tympanic membrane, ear canal and external ear normal.      Left Ear: Tympanic membrane, ear canal and external ear normal.      Nose: Nose normal.      Mouth/Throat:      Mouth: Mucous membranes are moist.      Pharynx: No oropharyngeal exudate or posterior oropharyngeal erythema.   Cardiovascular:      Rate and Rhythm: Normal rate and regular rhythm.      Heart sounds: Normal heart sounds.   Pulmonary:      Effort: Pulmonary effort is normal.      Breath sounds: Wheezing and rhonchi present. No rales.   Lymphadenopathy:      Cervical: No cervical adenopathy.   Neurological:      Mental Status: He is alert.       Assessment/Plan   Diagnoses and all orders for this visit:  Diarrhea, unspecified type  -     Stool Pathogen Panel, PCR; Future  -     Ova/Para + Giardia/Cryptosporidium Antigen; Future  -     C. difficile, PCR; Future  Bronchitis  -     doxycycline (Vibramycin) 100 mg capsule; Take 1 capsule (100 mg) by mouth 2 times a day for 10 days. Take with at least 8 ounces (large glass) of water, do not lie down for 30 minutes after  -     benzonatate (Tessalon) 200 mg capsule; Take 1 capsule (200 mg) by mouth once daily at bedtime for 10 days. Do not crush or chew.  -     albuterol 90 mcg/actuation inhaler; Inhale 2 puffs every 6 hours if needed for wheezing.  Other orders  -     Follow Up In Primary Care - Health Maintenance;  Future

## 2024-08-22 ENCOUNTER — TELEPHONE (OUTPATIENT)
Dept: PRIMARY CARE | Facility: CLINIC | Age: 29
End: 2024-08-22
Payer: COMMERCIAL

## 2024-08-22 DIAGNOSIS — R31.9 HEMATURIA, UNSPECIFIED TYPE: Primary | ICD-10-CM

## 2024-08-22 DIAGNOSIS — R10.9 FLANK PAIN: ICD-10-CM

## 2024-08-22 RX ORDER — SULFAMETHOXAZOLE AND TRIMETHOPRIM 800; 160 MG/1; MG/1
1 TABLET ORAL 2 TIMES DAILY
Qty: 14 TABLET | Refills: 0 | Status: SHIPPED | OUTPATIENT
Start: 2024-08-22 | End: 2024-08-23 | Stop reason: SDUPTHER

## 2024-08-22 RX ORDER — AMOXICILLIN AND CLAVULANATE POTASSIUM 875; 125 MG/1; MG/1
1 TABLET, FILM COATED ORAL 2 TIMES DAILY
Qty: 14 TABLET | Refills: 0 | Status: SHIPPED | OUTPATIENT
Start: 2024-08-22 | End: 2024-08-22 | Stop reason: ALTCHOICE

## 2024-08-22 NOTE — TELEPHONE ENCOUNTER
Pls call to find out his questions.  He needs to speak to dr vollweiler about his emesis issues. Unfortunately, I am not a gi specialist.  This is a long term issue.  Pt needs to follow what the ER says and stay if needed-not leave AMA  Is he using the phenergan?  Does he want zofran?

## 2024-08-22 NOTE — TELEPHONE ENCOUNTER
Pt called asking to speak with Maty. He said he has been hospitalized three times since his last apt on 8/19/24, that he is not doing well, in a lot of pain, and is having trouble keeping anything down. He said he is having trouble taking his medications because of this and would like to discuss this with her.

## 2024-08-22 NOTE — TELEPHONE ENCOUNTER
Had blood in urine and uti per ER .   Yes he is using phenergan.   Says he is in search for a new GI doc.  Recommended to go back to ER for fluids.   Says it hurts really bad to stand and every time he urinates it hurts badly and is dark dark yellow and smells.   Would also like something for pain.     He did have a CT scan at [facility]. I've printed the results for you.

## 2024-08-22 NOTE — TELEPHONE ENCOUNTER
was he treated for uti? No   Is there a urine culture? Yes all 3 ER's took sample    Is he having urinary sx? Pain when urinating and pain in back and right side   Come into the office in 2wks to give a urine sample to recheck for blood-   Does zofran work better for him for nausea/vomiting? Is allergic to this  He needs to call dr vollweiler for pain med or go back to ER for pain relief.  Does he have  pain med dr? No.   If not does he want a referral- no, they will not see him due to medical marijuana.

## 2024-08-22 NOTE — TELEPHONE ENCOUNTER
was he treated for uti?  Is there a urine culture?  Is he having urinary sx?  Come into the office in 2wks to give a urine sample to recheck for blood  Does zofran work better for him for nausea/vomiting?  He needs to call dr vollweiler for pain med or go back to ER for pain relief.  Does he have  pain med dr?  If not does he want a referral

## 2024-08-23 ENCOUNTER — TELEPHONE (OUTPATIENT)
Dept: PRIMARY CARE | Facility: CLINIC | Age: 29
End: 2024-08-23
Payer: COMMERCIAL

## 2024-08-23 DIAGNOSIS — R10.9 FLANK PAIN: ICD-10-CM

## 2024-08-23 DIAGNOSIS — R31.9 HEMATURIA, UNSPECIFIED TYPE: ICD-10-CM

## 2024-08-23 RX ORDER — SULFAMETHOXAZOLE AND TRIMETHOPRIM 800; 160 MG/1; MG/1
1 TABLET ORAL 2 TIMES DAILY
Qty: 14 TABLET | Refills: 0 | Status: SHIPPED | OUTPATIENT
Start: 2024-08-23 | End: 2024-08-30

## 2024-08-23 NOTE — TELEPHONE ENCOUNTER
Was taking antibiotic from ER even though yesterday he said he wasn't treated for uti  Causing vomiting.  Rx sent for bactrim yesterday thinking he wasn't treated for a uti  Pt did not  since it went to the wrong pharmacy  Rx sent to the correct pharmacy since he was having vomiting to the original antibiotic

## 2024-12-29 PROBLEM — R73.9 HYPERGLYCEMIA: Status: RESOLVED | Noted: 2023-03-02 | Resolved: 2024-12-29

## 2024-12-29 PROBLEM — E87.6 HYPOKALEMIA: Status: RESOLVED | Noted: 2023-07-17 | Resolved: 2024-12-29

## 2024-12-29 PROBLEM — M79.671 RIGHT FOOT PAIN: Status: RESOLVED | Noted: 2024-03-01 | Resolved: 2024-12-29

## 2024-12-31 ENCOUNTER — APPOINTMENT (OUTPATIENT)
Dept: PRIMARY CARE | Facility: CLINIC | Age: 29
End: 2024-12-31
Payer: COMMERCIAL

## 2025-03-04 ASSESSMENT — ENCOUNTER SYMPTOMS
VOMITING: 0
EYE REDNESS: 0
ARTHRALGIAS: 0
PALPITATIONS: 0
FATIGUE: 0
SORE THROAT: 0
MYALGIAS: 0
EYE DISCHARGE: 0
CHILLS: 0
FEVER: 0
FACIAL SWELLING: 0
DIARRHEA: 0
HEADACHES: 0

## 2025-03-04 NOTE — PROGRESS NOTES
Subjective   Patient ID: Wade Holden is a 29 y.o. male who presents for Vomiting.  Last physical: 12/26/23; due    current sx- head pressure,sinus, abd pain, back pain, foot pain, anxiety   when did sx start- Saturday   did pt take a covid-19 test? 3 times, took last one today   Yuliana broke up with him 4d ago    Urine for stds    HPI    ER since last appt: 5/2024, 8/2024 x 3, 9/2024, and today -all for n/v    Went to ER today for upper abd pain and n/v  Has not taken seroquel, propranolol ativan and protonix x4d  Still using marijuana daily  Alk phos and gpt elevated  Cbc nl, electrolytes normal. Lipase nl. Urine nl. Pos tox screen for marijuana  Ct abd pelvis normal  Given iv fluids, reglan, ativan, and morphine    Wants std testing    Sinus pressure, runny nose, stuffy nose, post nasal drip, cough with mucus, sob,wheezing, tight chest   No ear pain or pressure  No fever or chills at home  Abdominal pain and back pain-has gi dr  Anxiety-has psych np-girlfriend broke up with him, grandmother is dying  Foot pain-needle feeling on off  Supposed to get neck surgery  Hasn't been able to go to PT since no insurance, but got insurance 3/1/25    no sob, wheezing, tight upper chest, fever, chills, muscle/jt pain,  ST, new loss of taste or smell, diarrhea, vomiting, nausea, abdominal pain, fatigue, weakness, red eye, rash, bruising, cyanosis, ear pain, ear pressure, runny nose, stuffy nose, post nasal drip, pain with deep breath, leg or foot swelling, calf pain  selftxt-none, has not used albuterol inh but has one at home    No known exposure to COVID-19  No known exposure to strep  No known exposure to influenza  No one sick around the pt    Risk factors:  Chronic disease/comorbidities: smoker  not a healthcare worker  Age: not 65yrs of age and older        No care team member to display     Review of Systems   Constitutional:  Negative for chills, fatigue and fever.   HENT:  Positive for congestion, postnasal  drip, rhinorrhea and sinus pressure. Negative for ear discharge, ear pain, facial swelling and sore throat.    Eyes:  Negative for discharge and redness.   Respiratory:  Positive for cough, chest tightness, shortness of breath and wheezing.    Cardiovascular:  Negative for chest pain, palpitations and leg swelling.   Gastrointestinal:  Positive for abdominal pain. Negative for diarrhea and vomiting.   Musculoskeletal:  Positive for back pain. Negative for arthralgias and myalgias.   Skin:  Negative for rash.   Neurological:  Negative for headaches.       Objective   Visit Vitals  BP (!) 166/108   Pulse 85   Temp 36.3 °C (97.3 °F)      BP Readings from Last 3 Encounters:   03/05/25 (!) 166/108   08/19/24 131/87   05/20/24 121/81     Wt Readings from Last 3 Encounters:   03/05/25 76.1 kg (167 lb 12.8 oz)   08/19/24 74 kg (163 lb 3.2 oz)   05/29/24 71.7 kg (158 lb)       Physical Exam  Constitutional:       Appearance: Normal appearance.   HENT:      Head: Normocephalic.      Right Ear: Tympanic membrane, ear canal and external ear normal.      Left Ear: Tympanic membrane, ear canal and external ear normal.      Nose: Nose normal.      Mouth/Throat:      Mouth: Mucous membranes are moist.      Pharynx: No oropharyngeal exudate or posterior oropharyngeal erythema.   Cardiovascular:      Rate and Rhythm: Normal rate and regular rhythm.      Heart sounds: Normal heart sounds.   Pulmonary:      Effort: Pulmonary effort is normal.      Breath sounds: Normal breath sounds. No wheezing, rhonchi or rales.   Lymphadenopathy:      Cervical: No cervical adenopathy.   Neurological:      Mental Status: He is alert.         Assessment/Plan   Diagnoses and all orders for this visit:  Screen for STD (sexually transmitted disease)  -     Trichomonas vaginalis, Amplified; Future  -     HSV1 IgG and HSV2 IgG; Future  -     HIV 1/2 Antigen/Antibody Screen with Reflex to Confirmation; Future  -     Hepatitis Panel, Acute; Future  -     C.  trachomatis / N. gonorrhoeae, Amplified, Urogenital; Future  -     Syphilis Screen with Reflex; Future  Bronchitis  -     azithromycin (Zithromax Z-Popeye) 250 mg tablet; Take 2 tabs po the first day and then 1 tab daily days 2-5  Other orders  -     Follow Up In Primary Care - Health Maintenance; Future        See patient instructions for full plan

## 2025-03-05 ENCOUNTER — OFFICE VISIT (OUTPATIENT)
Dept: PRIMARY CARE | Facility: CLINIC | Age: 30
End: 2025-03-05
Payer: COMMERCIAL

## 2025-03-05 VITALS
WEIGHT: 167.8 LBS | HEART RATE: 85 BPM | TEMPERATURE: 97.3 F | BODY MASS INDEX: 24.85 KG/M2 | HEIGHT: 69 IN | OXYGEN SATURATION: 96 % | SYSTOLIC BLOOD PRESSURE: 166 MMHG | DIASTOLIC BLOOD PRESSURE: 108 MMHG

## 2025-03-05 DIAGNOSIS — R11.2 NAUSEA AND VOMITING, UNSPECIFIED VOMITING TYPE: ICD-10-CM

## 2025-03-05 DIAGNOSIS — Z11.3 SCREEN FOR STD (SEXUALLY TRANSMITTED DISEASE): Primary | ICD-10-CM

## 2025-03-05 DIAGNOSIS — J40 BRONCHITIS: ICD-10-CM

## 2025-03-05 PROCEDURE — 99214 OFFICE O/P EST MOD 30 MIN: CPT | Performed by: NURSE PRACTITIONER

## 2025-03-05 PROCEDURE — 3008F BODY MASS INDEX DOCD: CPT | Performed by: NURSE PRACTITIONER

## 2025-03-05 RX ORDER — AZITHROMYCIN 250 MG/1
TABLET, FILM COATED ORAL
Qty: 6 TABLET | Refills: 0 | Status: SHIPPED | OUTPATIENT
Start: 2025-03-05

## 2025-03-05 ASSESSMENT — ENCOUNTER SYMPTOMS
CHEST TIGHTNESS: 1
COUGH: 1
ABDOMINAL PAIN: 1
RHINORRHEA: 1
BACK PAIN: 1
SINUS PRESSURE: 1
WHEEZING: 1
SHORTNESS OF BREATH: 1

## 2025-03-05 ASSESSMENT — PATIENT HEALTH QUESTIONNAIRE - PHQ9
1. LITTLE INTEREST OR PLEASURE IN DOING THINGS: SEVERAL DAYS
SUM OF ALL RESPONSES TO PHQ9 QUESTIONS 1 AND 2: 2
2. FEELING DOWN, DEPRESSED OR HOPELESS: SEVERAL DAYS

## 2025-03-05 NOTE — PATIENT INSTRUCTIONS
Contact JUVENCIO Crandall about anxiety    Contact dr vollweiler about abd pain    Std testing today    Mucinex plain  Mucinex dm night  Zyrtec for nasal symptoms  Fluids  Rest  Zpak    Return for physical    Pt started vomiting at the end of the appt. His mom is going to take him back to ER. He declines an ambulance.    I will communicate with you via [employer] regarding messages and results. If you need help with this, you can call the support line at [phone].    IT WAS A PLEASURE TO SEE YOU TODAY. THANK YOU FOR CHOOSING US FOR YOUR HEALTHCARE NEEDS.

## 2025-03-06 ENCOUNTER — TELEPHONE (OUTPATIENT)
Dept: PRIMARY CARE | Facility: CLINIC | Age: 30
End: 2025-03-06
Payer: COMMERCIAL

## 2025-03-06 LAB
C TRACH RRNA SPEC QL NAA+PROBE: NORMAL
T VAGINALIS RRNA SPEC QL NAA+PROBE: NORMAL

## 2025-03-06 NOTE — TELEPHONE ENCOUNTER
----- Message from Maty Flor sent at 3/6/2025 11:14 AM EST -----  The urine tube was overfilled and they won't run the sample. Pls let pt know to come in and give another urine sample. No appt needed.

## 2025-03-07 ENCOUNTER — APPOINTMENT (OUTPATIENT)
Dept: PRIMARY CARE | Facility: CLINIC | Age: 30
End: 2025-03-07
Payer: COMMERCIAL

## 2025-03-08 LAB
C TRACH RRNA SPEC QL NAA+PROBE: NOT DETECTED
N GONORRHOEA RRNA SPEC QL NAA+PROBE: NOT DETECTED
QUEST GC CT AMPLIFIED (ALWAYS MESSAGE): NORMAL
T VAGINALIS RRNA SPEC QL NAA+PROBE: NOT DETECTED

## 2025-04-15 ENCOUNTER — OFFICE VISIT (OUTPATIENT)
Dept: PRIMARY CARE | Facility: CLINIC | Age: 30
End: 2025-04-15
Payer: COMMERCIAL

## 2025-04-15 VITALS
BODY MASS INDEX: 25.18 KG/M2 | HEIGHT: 69 IN | HEART RATE: 77 BPM | TEMPERATURE: 98 F | DIASTOLIC BLOOD PRESSURE: 80 MMHG | SYSTOLIC BLOOD PRESSURE: 130 MMHG | WEIGHT: 170 LBS | OXYGEN SATURATION: 95 %

## 2025-04-15 DIAGNOSIS — I88.9 LYMPHADENITIS: Primary | ICD-10-CM

## 2025-04-15 PROCEDURE — 99213 OFFICE O/P EST LOW 20 MIN: CPT | Performed by: FAMILY MEDICINE

## 2025-04-15 PROCEDURE — 3008F BODY MASS INDEX DOCD: CPT | Performed by: FAMILY MEDICINE

## 2025-04-15 RX ORDER — CEPHALEXIN 500 MG/1
500 CAPSULE ORAL 2 TIMES DAILY
Qty: 20 CAPSULE | Refills: 0 | Status: SHIPPED | OUTPATIENT
Start: 2025-04-15 | End: 2025-04-25

## 2025-04-15 ASSESSMENT — ENCOUNTER SYMPTOMS
OCCASIONAL FEELINGS OF UNSTEADINESS: 0
DEPRESSION: 0
LOSS OF SENSATION IN FEET: 0

## 2025-04-15 ASSESSMENT — COLUMBIA-SUICIDE SEVERITY RATING SCALE - C-SSRS
2. HAVE YOU ACTUALLY HAD ANY THOUGHTS OF KILLING YOURSELF?: NO
1. IN THE PAST MONTH, HAVE YOU WISHED YOU WERE DEAD OR WISHED YOU COULD GO TO SLEEP AND NOT WAKE UP?: NO
6. HAVE YOU EVER DONE ANYTHING, STARTED TO DO ANYTHING, OR PREPARED TO DO ANYTHING TO END YOUR LIFE?: NO

## 2025-04-15 ASSESSMENT — PATIENT HEALTH QUESTIONNAIRE - PHQ9
SUM OF ALL RESPONSES TO PHQ9 QUESTIONS 1 AND 2: 0
2. FEELING DOWN, DEPRESSED OR HOPELESS: NOT AT ALL
1. LITTLE INTEREST OR PLEASURE IN DOING THINGS: NOT AT ALL

## 2025-04-15 NOTE — PROGRESS NOTES
"Subjective   Patient ID: Wade Holden is a 29 y.o. male who presents for Follow-up (Pt is her for sore throat and neck pain ).  HPI    Review of Systems    Objective    BP (!) 154/92   Pulse 77   Temp 36.7 °C (98 °F)   Ht 1.753 m (5' 9\")   Wt 77.1 kg (170 lb)   SpO2 95%   BMI 25.10 kg/m²    Physical Exam  The patient appeared well nourished and normally developed. Vital signs as documented. Head exam is unremarkable. No scleral icterus or corneal arcus noted.  Pupils are equal round reactive to light extraocular movements are intact no hemorrhages noted on funduscopic exam mouth mucous membranes are moist no exudates ears canals clear TMs are gray pearly not injected nose no rhinorrhea or epistaxis Neck is without jugular venous distension, thyromegaly, or carotid bruits. Carotid upstrokes are brisk bilaterally. Lungs are clear to auscultation and percussion. Cardiac exam reveals the PMI to be normally sized and situated. Rhythm is regular. First and second heart sounds normal. No murmurs, rubs or gallops. Abdominal exam reveals normal bowel sounds, no masses, no organomegaly and no aortic enlargement. Extremities are nonedematous and both femoral and pedal pulses are normal.  Neurologic exam DTRs are equal bilaterally no focal deficits strength is symmetrical heme lymph no palpable lymph nodes in the neck axilla or groin lymphadenopathy very shoddy left neck and clavicle tender no axillary masses some small area of erythema over the forearm over recent tattoo    Assessment/Plan   Problem List Items Addressed This Visit    None           Jo Ann Fulton MD  " or carotid bruits. Carotid upstrokes are brisk bilaterally. Lungs are clear to auscultation and percussion. Cardiac exam reveals the PMI to be normally sized and situated. Rhythm is regular. First and second heart sounds normal. No murmurs, rubs or gallops. Abdominal exam reveals normal bowel sounds, no masses, no organomegaly and no aortic enlargement. Extremities are nonedematous and both femoral and pedal pulses are normal.  Neurologic exam DTRs are equal bilaterally no focal deficits strength is symmetrical heme lymph no palpable lymph nodes in the neck axilla or groin lymphadenopathy very shoddy left neck and clavicle tender no axillary masses some small area of erythema over the forearm over recent tattoo    Assessment/Plan   Problem List Items Addressed This Visit       Lymphadenitis - Primary            Jo Ann Fulton MD

## 2025-04-28 PROBLEM — I88.9 LYMPHADENITIS: Status: ACTIVE | Noted: 2025-04-28
